# Patient Record
(demographics unavailable — no encounter records)

---

## 2024-10-22 NOTE — ASSESSMENT
[FreeTextEntry1] : Released from dry ear precautions Hearing much better compared to preop RV 2-3 months for final audio

## 2024-10-22 NOTE — REASON FOR VISIT
[Post-Operative Visit] : a post-operative visit [FreeTextEntry2] : s/p right stapedectomy, hearing loss, anomaly of middle ear

## 2024-10-22 NOTE — HISTORY OF PRESENT ILLNESS
[FreeTextEntry1] : Patient returns today s/p right stapedectomy 9/24/24, conductive hearing loss of right ear, anomaly of middle ear. States that he has soreness in right ear. Denies pain. Used Ofloxacin. No signs of infection.

## 2025-01-01 NOTE — REASON FOR VISIT
[Subsequent Evaluation] : a subsequent evaluation for [FreeTextEntry2] : s/p right stapedectomy, hearing loss, anomaly of middle ear

## 2025-01-01 NOTE — ASSESSMENT
[FreeTextEntry1] : Substantial improvement of ABG AD Counseled on s/s to return to clinic for further evaluation Will trial Fluticasone for occ aural fullness au

## 2025-01-01 NOTE — HISTORY OF PRESENT ILLNESS
[FreeTextEntry1] : Patient is following up today s/p right stapedectomy, hearing loss, anomaly of middle ear He states feels as if his ears are wet and he feels like fluid is in the ear. Reports this is worse after a shower. Had audio today, here to discuss results.  No further complaints